# Patient Record
Sex: MALE
[De-identification: names, ages, dates, MRNs, and addresses within clinical notes are randomized per-mention and may not be internally consistent; named-entity substitution may affect disease eponyms.]

---

## 2017-12-22 ENCOUNTER — HOSPITAL ENCOUNTER (EMERGENCY)
Dept: HOSPITAL 31 - C.ER | Age: 22
Discharge: HOME | End: 2017-12-22
Payer: COMMERCIAL

## 2017-12-22 VITALS
TEMPERATURE: 97.5 F | HEART RATE: 65 BPM | DIASTOLIC BLOOD PRESSURE: 73 MMHG | SYSTOLIC BLOOD PRESSURE: 134 MMHG | OXYGEN SATURATION: 98 %

## 2017-12-22 VITALS — RESPIRATION RATE: 20 BRPM

## 2017-12-22 DIAGNOSIS — Z23: ICD-10-CM
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
## 2017-12-22 DIAGNOSIS — S01.21XA: Primary | ICD-10-CM

## 2017-12-22 NOTE — C.PDOC
History Of Present Illness


Patient is a 21 y/o male who presents to the ED with a complaint of laceration 

to the nasal bridge. 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 

when the door cam down and hit his nose. Denies any LOC. No other physical 

complaints at this time. 


Time Seen by Provider: 12/22/17 19:36


Chief Complaint (Nursing): Abnormal Skin Integrity


History Per: Patient


History/Exam Limitations: no limitations


Onset/Duration Of Symptoms: Hrs (PTA)


Current Symptoms Are (Timing): Still Present


Recent travel outside of the [place]: No





Past Medical History


Reviewed: Historical Data, Nursing Documentation, Vital Signs


Vital Signs: 


 Last Vital Signs











Temp  97.5 F L  12/22/17 19:39


 


Pulse  65   12/22/17 19:39


 


Resp  20   12/22/17 21:43


 


BP  134/73   12/22/17 19:39


 


Pulse Ox  98   12/22/17 21:53














- Medical History


PMH: No Chronic Diseases


Surgical History: No Surg Hx


Family History: States: No Known Family Hx





- Social History


Hx Alcohol Use: No


Hx Substance Use: No





Review Of Systems


Skin: Positive for: Other (laceration to nasal bridge)





Physical Exam





- Physical Exam


Appears: Well, Non-toxic, No Acute Distress


Skin: Other (2 cm laceration to nasal bridge)


Head: Normacephalic


Eye(s): bilateral: Normal Inspection, PERRL, EOMI


Ear(s): Bilateral: Normal


Nose: Normal, No Epistaxis, No Deformity, No Septal Hematoma


Oral Mucosa: Moist


Neck: Normal ROM, Supple


Chest: Symmetrical


Respiratory: No Accessory Muscle Use


Extremity: Normal ROM


Neurological/Psych: Oriented x3, Normal Speech, Normal Cognition





ED Course And Treatment


O2 Sat by Pulse Oximetry: 98 (room air)


Pulse Ox Interpretation: Normal





- Other Rad


  ** Nasal FX


X-Ray: Interpreted by Me, Viewed By Me


Interpretation: no fx or dislocation


Progress Note: Plan: Adacel, Lidocaine, Motrin, and Bacitracin administered. 

Stitches administered.  Patients condition remained stable throughout 

Emergency Department evaluation with no evidence of neurologic instability.  

There is always a risk of undetected foreign body nerve or tendon injury, 

therefore the importance of close follow-up care was stressed.





Laceration





- Laceration Repair


  ** nasal bridge


Wound Length (In cm): 2


Description Of Wound: Linear


Wound Cleansed With: Betadine, Sterile Saline


Anesthesia: Lidocaine 1%


Wound Examination: Irrigated With Saline, No FB With Wound Exploration, No 

Tendon Injury With Wound Exploration


Wound Closure: Suture (2)


Suture Technique And Material Used: Interrupted


Wound Complexity: Simple





Disposition





- Disposition


Disposition: HOME/ ROUTINE


Disposition Time: 21:21


Condition: STABLE


Additional Instructions: 


Suture removal in 5-7 days. Watch for signs of infection including redness, 

swelling and discharge. Return to ER if symptoms persist or worsen. 








Eliminacin de sutura en 5-7 coronado. Est atento a los signos de infeccin, 

incluyendo enrojecimiento, hinchazn y secrecin. Regrese a la josé miguel de 

emergencias si los sntomas persisten o empeoran.


Prescriptions: 


Bacitracin OINT 1 applic TP BID #1 tube


Instructions:  Facial Laceration (ED)


Forms:  [facility] (English)





- Clinical Impression


Clinical Impression: 


 Facial laceration








- Scribe Statement


The provider has reviewed the documentation as recorded by the Scribe





Mariely Kaye





All medical record entries made by the Scribe were at my direction and 

personally dictated by me. I have reviewed the chart and agree that the record 

accurately reflects my personal performance of the history, physical exam, 

medical decision making, and the department course for this patient. I have 

also personally directed, reviewed, and agree with the discharge instructions 

and disposition.

## 2018-01-18 ENCOUNTER — HOSPITAL ENCOUNTER (EMERGENCY)
Dept: HOSPITAL 31 - C.ER | Age: 23
Discharge: HOME | End: 2018-01-18
Payer: COMMERCIAL

## 2018-01-18 VITALS
TEMPERATURE: 100.9 F | SYSTOLIC BLOOD PRESSURE: 146 MMHG | HEART RATE: 82 BPM | OXYGEN SATURATION: 100 % | DIASTOLIC BLOOD PRESSURE: 80 MMHG

## 2018-01-18 VITALS — BODY MASS INDEX: 25.3 KG/M2

## 2018-01-18 VITALS — RESPIRATION RATE: 18 BRPM

## 2018-01-18 DIAGNOSIS — J11.1: Primary | ICD-10-CM

## 2018-01-18 NOTE — C.PDOC
History Of Present Illness





Kenluis Reyes is a 22 year old male, with no past medical history, who presents 

to the emergency department complaining of sore throat, body aches, and fever 

onset since yesterday. Patient reports a Tmax of 102 at home yesterday. He 

denies any nausea, vomit, neck pain, or headache. No further medical complaints.





PMD: None provided. 


Time Seen by Provider: 01/18/18 16:16


Chief Complaint (Nursing): Flu-like Symptoms


History Per: Patient


History/Exam Limitations: no limitations


Onset/Duration Of Symptoms: Days (x1)


Current Symptoms Are (Timing): Still Present


Location Of Pain: Throat


Associated Symptoms: Fever, Sore Throat, Other (body aches).  denies: Neck Pain

, Nausea, Vomiting


Ear Symptoms: Bilateral: None





Past Medical History


Reviewed: Historical Data, Nursing Documentation, Vital Signs


Vital Signs: 


 Last Vital Signs











Temp  100.9 F H  01/18/18 16:03


 


Pulse  82   01/18/18 16:03


 


Resp  20   01/18/18 16:03


 


BP  146/80   01/18/18 16:03


 


Pulse Ox  100   01/18/18 16:58














- Medical History


PMH: No Chronic Diseases


Surgical History: No Surg Hx


Family History: States: Unknown Family Hx





- Social History


Hx Tobacco Use: No


Hx Alcohol Use: No


Hx Substance Use: No





- Immunization History


Hx Tetanus Toxoid Vaccination: No


Hx Influenza Vaccination: No


Hx Pneumococcal Vaccination: No





Review Of Systems


Constitutional: Positive for: Fever, Other (body aches)


ENT: Positive for: Throat Pain


Cardiovascular: Negative for: Chest Pain


Respiratory: Negative for: Shortness of Breath


Gastrointestinal: Negative for: Nausea, Vomiting


Musculoskeletal: Negative for: Neck Pain


Neurological: Negative for: Headache





Physical Exam





- Physical Exam


Appears: Other (Ill)


Skin: No Normal Color (flushed)


Head: Atraumatic, Normacephalic


Eye(s): bilateral: Normal Inspection (watery), PERRL, EOMI


Ear(s): Bilateral: Normal


Nose: Normal


Throat: Erythema


Neck: Normal ROM


Cardiovascular: Rhythm Regular


Respiratory: Normal Breath Sounds, No Accessory Muscle Use


Gastrointestinal/Abdominal: Normal Exam, Soft, No Tenderness


Extremity: Normal ROM, No Deformity, No Swelling


Neurological/Psych: Oriented x3 (alert)





ED Course And Treatment


O2 Sat by Pulse Oximetry: 100 (RA)


Pulse Ox Interpretation: Normal





Medical Decision Making


Medical Decision Making: 





Initial Impression: URI, Flu





Initial Plan:





--Motrin tab 600 mg PO


--Tamiflu Cap 75 mg PO BID


--Tylenol 325 mg tab 975 mg PO





Disposition


Counseled Patient/Family Regarding: Diagnosis, Need For Followup, Rx Given





- Disposition


Referrals: 


[facility] [Outside]


Disposition: HOME/ ROUTINE


Disposition Time: 17:02


Condition: STABLE


Additional Instructions: 


Siga en la clinica. 


Tome mucha AGUA, DESCANSE. 


Prescriptions: 


Ibuprofen [Motrin] 1 tab PO TID PRN #30 tab


 PRN Reason: Pain


Oseltamivir [Tamiflu] 1 cap PO BID #10 cap


Instructions:  Viral Syndrome (ED)


Forms:  Gen Discharge Inst [place], [facility] ([place]), Work Excuse





- POA


Present On Arrival: None





- Clinical Impression


Clinical Impression: 


 Influenza-like illness








- Scribe Statement





Richard Hendricks


Provider Attestation: 








All medical record entries made by the Scribe were at my direction and 

personally dictated by me. I have reviewed the chart and agree that the record 

accurately reflects my personal performance of the history, physical exam, 

medical decision making, and the department course for this patient. I have 

also personally directed, reviewed, and agree with the discharge instructions 

and disposition.